# Patient Record
Sex: FEMALE | URBAN - METROPOLITAN AREA
[De-identification: names, ages, dates, MRNs, and addresses within clinical notes are randomized per-mention and may not be internally consistent; named-entity substitution may affect disease eponyms.]

---

## 2023-04-13 DIAGNOSIS — J45.909 ASTHMA, UNSPECIFIED ASTHMA SEVERITY, UNSPECIFIED WHETHER COMPLICATED, UNSPECIFIED WHETHER PERSISTENT (HHS-HCC): Primary | ICD-10-CM

## 2023-04-13 PROBLEM — I10 HYPERTENSION: Status: ACTIVE | Noted: 2023-04-13

## 2023-04-13 PROBLEM — E66.9 OBESITY, CLASS I, BMI 30-34.9: Status: ACTIVE | Noted: 2023-04-13

## 2023-04-13 PROBLEM — E66.811 OBESITY, CLASS I, BMI 30-34.9: Status: ACTIVE | Noted: 2023-04-13

## 2023-04-13 PROBLEM — R79.89 ELEVATED LFTS: Status: ACTIVE | Noted: 2023-04-13

## 2023-04-13 PROBLEM — E55.9 VITAMIN D DEFICIENCY: Status: ACTIVE | Noted: 2023-04-13

## 2023-04-13 PROBLEM — E11.9 DIABETES MELLITUS, TYPE 2 (MULTI): Status: ACTIVE | Noted: 2023-04-13

## 2023-04-13 PROBLEM — E78.5 HYPERLIPIDEMIA: Status: ACTIVE | Noted: 2023-04-13

## 2023-04-13 RX ORDER — BUDESONIDE AND FORMOTEROL FUMARATE DIHYDRATE 160; 4.5 UG/1; UG/1
2 AEROSOL RESPIRATORY (INHALATION) 2 TIMES DAILY
COMMUNITY
Start: 2020-03-17 | End: 2023-10-16

## 2023-04-13 RX ORDER — ASPIRIN 81 MG/1
1 TABLET ORAL DAILY
COMMUNITY
Start: 2017-11-08

## 2023-04-13 RX ORDER — METFORMIN HYDROCHLORIDE 1000 MG/1
TABLET ORAL
COMMUNITY
Start: 2017-11-08 | End: 2023-12-05 | Stop reason: ALTCHOICE

## 2023-04-13 RX ORDER — ALBUTEROL SULFATE 90 UG/1
2 AEROSOL, METERED RESPIRATORY (INHALATION) EVERY 4 HOURS PRN
Qty: 18 G | Refills: 2 | Status: SHIPPED | OUTPATIENT
Start: 2023-04-13 | End: 2023-12-05 | Stop reason: SDUPTHER

## 2023-04-13 RX ORDER — LORATADINE 10 MG/1
1 TABLET ORAL DAILY
COMMUNITY
Start: 2017-11-08

## 2023-04-13 RX ORDER — ALBUTEROL SULFATE 90 UG/1
2 AEROSOL, METERED RESPIRATORY (INHALATION) EVERY 4 HOURS PRN
COMMUNITY
Start: 2020-03-17 | End: 2023-04-13 | Stop reason: SDUPTHER

## 2023-04-13 RX ORDER — FLUTICASONE PROPIONATE 50 MCG
2 SPRAY, SUSPENSION (ML) NASAL DAILY
COMMUNITY
Start: 2017-11-08

## 2023-04-13 RX ORDER — CARVEDILOL 6.25 MG/1
1 TABLET ORAL
COMMUNITY
Start: 2017-11-08 | End: 2023-11-01 | Stop reason: SDUPTHER

## 2023-04-13 RX ORDER — ATORVASTATIN CALCIUM 10 MG/1
1 TABLET, FILM COATED ORAL NIGHTLY
COMMUNITY
Start: 2020-03-17 | End: 2024-01-30 | Stop reason: SDUPTHER

## 2023-04-13 RX ORDER — LISINOPRIL 40 MG/1
1 TABLET ORAL DAILY
COMMUNITY
Start: 2015-10-23 | End: 2023-12-05 | Stop reason: SDUPTHER

## 2023-04-13 NOTE — TELEPHONE ENCOUNTER
Requested Prescriptions     Pending Prescriptions Disp Refills    albuterol 90 mcg/actuation inhaler 18 g 2     Sig: Inhale 2 puffs every 4 hours if needed for wheezing.

## 2023-11-01 DIAGNOSIS — I10 PRIMARY HYPERTENSION: Primary | ICD-10-CM

## 2023-11-01 NOTE — TELEPHONE ENCOUNTER
Patient is requesting a refill on her       Carvedilol 6.25 mg      Sent to [facility]      Thank You       Patient has an appointment on 12/5/23

## 2023-11-01 NOTE — TELEPHONE ENCOUNTER
Patient requests prescription below    Last Office Visit: 2/17/23  Pt has apt scheduled  Requested Prescriptions     Pending Prescriptions Disp Refills    carvedilol (Coreg) 6.25 mg tablet 60 tablet 1     Sig: Take 1 tablet (6.25 mg) by mouth 2 times a day with meals.

## 2023-11-02 RX ORDER — CARVEDILOL 6.25 MG/1
6.25 TABLET ORAL
Qty: 60 TABLET | Refills: 3 | Status: SHIPPED | OUTPATIENT
Start: 2023-11-02 | End: 2024-04-03 | Stop reason: SDUPTHER

## 2023-12-05 ENCOUNTER — OFFICE VISIT (OUTPATIENT)
Dept: PRIMARY CARE | Facility: CLINIC | Age: 48
End: 2023-12-05
Payer: COMMERCIAL

## 2023-12-05 VITALS
OXYGEN SATURATION: 95 % | WEIGHT: 162.4 LBS | BODY MASS INDEX: 29.88 KG/M2 | RESPIRATION RATE: 18 BRPM | TEMPERATURE: 97.6 F | HEIGHT: 62 IN | HEART RATE: 92 BPM | DIASTOLIC BLOOD PRESSURE: 103 MMHG | SYSTOLIC BLOOD PRESSURE: 158 MMHG

## 2023-12-05 DIAGNOSIS — E55.9 VITAMIN D DEFICIENCY: ICD-10-CM

## 2023-12-05 DIAGNOSIS — E78.5 HYPERLIPIDEMIA, UNSPECIFIED HYPERLIPIDEMIA TYPE: ICD-10-CM

## 2023-12-05 DIAGNOSIS — E11.9 TYPE 2 DIABETES MELLITUS WITHOUT COMPLICATION, WITHOUT LONG-TERM CURRENT USE OF INSULIN (MULTI): ICD-10-CM

## 2023-12-05 DIAGNOSIS — J45.909 ASTHMA, UNSPECIFIED ASTHMA SEVERITY, UNSPECIFIED WHETHER COMPLICATED, UNSPECIFIED WHETHER PERSISTENT (HHS-HCC): ICD-10-CM

## 2023-12-05 DIAGNOSIS — Z83.49 FAMILY HISTORY OF THYROID DISEASE: ICD-10-CM

## 2023-12-05 DIAGNOSIS — E53.8 VITAMIN B12 DEFICIENCY: ICD-10-CM

## 2023-12-05 DIAGNOSIS — I10 HYPERTENSION, UNSPECIFIED TYPE: Primary | ICD-10-CM

## 2023-12-05 PROCEDURE — 99213 OFFICE O/P EST LOW 20 MIN: CPT | Performed by: FAMILY MEDICINE

## 2023-12-05 RX ORDER — METFORMIN HYDROCHLORIDE 500 MG/1
1000 TABLET, EXTENDED RELEASE ORAL
Qty: 360 TABLET | Refills: 1 | Status: SHIPPED | OUTPATIENT
Start: 2023-12-05 | End: 2024-06-02

## 2023-12-05 RX ORDER — LISINOPRIL 40 MG/1
40 TABLET ORAL DAILY
Qty: 90 TABLET | Refills: 1 | Status: SHIPPED | OUTPATIENT
Start: 2023-12-05 | End: 2024-06-02

## 2023-12-05 RX ORDER — ALBUTEROL SULFATE 90 UG/1
2 AEROSOL, METERED RESPIRATORY (INHALATION) EVERY 4 HOURS PRN
Qty: 18 G | Refills: 1 | Status: SHIPPED | OUTPATIENT
Start: 2023-12-05

## 2023-12-05 NOTE — PROGRESS NOTES
Subjective   Patient ID: Marielos Vazquez is a 48 y.o. female who presents for Med Refill (6 month med check, ).    HPI   Patient comes in today for checkup and refill of her medications.  The extended release metformin is working much better for her than the regular metformin.  She is currently without complaints.  Her blood pressure is running on the higher side and I have asked her to start checking it at home on a regular basis to see how high it actually is.  She will do so.  She is also way overdue for lab work.    2/17/2023  Patient comes in today for checkup and refill of medications. She had a hemoglobin A1c in August 2022 which was 6.8. She has been taking all her medicines as directed except she ran out of her blood pressure medications and her blood pressure is high today. She is having an issue with metformin causing loose stools.    2/18/2022  Patient presents today for 6-month checkup and refill of meds. She currently is without complaints. She states that she is taking her medicines as directed and is not having any side effects from them.    8/31/2021  Patient presents today for 6-month checkup and refill of meds. She currently is without complaints. She states that she is taking her medicines as directed and is not having any side effects from them. She did not take her blood pressure medicines today and thus her blood pressure is high. She states she just recently had a wellness check at work and her blood pressure was 124/80. They did also draw blood and her total cholesterol was 202, HDL 42, , triglycerides 266, TC/HDL ratio 4.8 and hemoglobin A1c 6.8. I asked her to get me a copy of all these for her chart. Patient is also engaged and she and her fiancé are getting  next year.    10/26/2020  Patient comes in today after tripping and falling onto her face 2 days ago on concrete. She sustained an abrasion to her upper lip and nose and a cut on the inside of her lip and her upper teeth  "are numb but not loose. She denies any other symptoms or injuries at this time.      3/17/2020  Patient comes in today for her first visit in 4 years. She comes in the midst of a coronavirus pandemic but she is otherwise healthy currently. She needs a refill of all her medications as well as her lab work. She is otherwise without complaints.    1/22/16  Patient comes in today for follow-up on her blood pressure. It is still high. She states that she has not had it checked outside of the office here but it has been high each visit since she has been coming here. I have asked her to have it checked at work she states that she can. If it is high there as well then we need to fine tune it a little bit more. She has been watching her diet as well as her caffeine intake. She has lost 2 pounds since her last visit here. Her mother and sister have blood pressure issues as well.       Review of Systems   All other systems reviewed and are negative.      Objective   BP (!) 158/103   Pulse 92   Temp 36.4 °C (97.6 °F)   Resp 18   Ht 1.575 m (5' 2\")   Wt 73.7 kg (162 lb 6.4 oz)   SpO2 95%   BMI 29.70 kg/m²     Physical Exam  Constitutional:       Appearance: She is well-developed.   HENT:      Head: Normocephalic and atraumatic.      Right Ear: Tympanic membrane, ear canal and external ear normal.      Left Ear: Tympanic membrane, ear canal and external ear normal.      Nose: Nose normal.      Mouth/Throat:      Mouth: Mucous membranes are moist.      Pharynx: Oropharynx is clear.   Eyes:      Extraocular Movements: Extraocular movements intact.      Conjunctiva/sclera: Conjunctivae normal.      Pupils: Pupils are equal, round, and reactive to light.   Cardiovascular:      Rate and Rhythm: Normal rate and regular rhythm.      Heart sounds: Normal heart sounds. No murmur heard.  Pulmonary:      Effort: Pulmonary effort is normal.      Breath sounds: Normal breath sounds. No wheezing.   Abdominal:      General: Abdomen is " flat. Bowel sounds are normal.      Palpations: Abdomen is soft.   Musculoskeletal:         General: Normal range of motion.   Skin:     General: Skin is warm and dry.   Neurological:      General: No focal deficit present.      Mental Status: She is alert and oriented to person, place, and time. Mental status is at baseline.   Psychiatric:         Mood and Affect: Mood normal.         Behavior: Behavior normal.         Thought Content: Thought content normal.         Judgment: Judgment normal.         Assessment/Plan   Problem List Items Addressed This Visit             ICD-10-CM    Asthma J45.909    Relevant Medications    albuterol 90 mcg/actuation inhaler    Diabetes mellitus, type 2 (CMS/[facility]) E11.9    Relevant Medications    metFORMIN XR (Glucophage-XR) 500 mg 24 hr tablet    Other Relevant Orders    Hemoglobin A1C    Hyperlipidemia E78.5    Relevant Orders    Lipid Panel    Hypertension - Primary I10    Relevant Medications    lisinopril 40 mg tablet    Other Relevant Orders    Comprehensive Metabolic Panel    Vitamin D deficiency E55.9    Relevant Orders    Vitamin D 25-Hydroxy,Total (for eval of Vitamin D levels)     Other Visit Diagnoses         Codes    Family history of thyroid disease     Z83.49    Relevant Orders    TSH with reflex to Free T4 if abnormal    Vitamin B12 deficiency     E53.8    Relevant Orders    CBC    Vitamin B12        Will contact patient with lab results when available.  Continue current meds as directed.  Follow up in 6 months for recheck if all remains stable, sooner if problems arise.  Medication list reconciled.  Thank you for visiting today!      Professional services: Some of this note was completed using Dragon voice technology and sometimes the software misinterprets words. This may include unintended errors with respect to translation of words, typographical errors or grammar errors which may not have been identified prior to finalization of the chart note. Please take this  into account when reading the note. Thank you.

## 2024-01-30 DIAGNOSIS — E78.5 HYPERLIPIDEMIA, UNSPECIFIED HYPERLIPIDEMIA TYPE: ICD-10-CM

## 2024-01-30 RX ORDER — ATORVASTATIN CALCIUM 10 MG/1
10 TABLET, FILM COATED ORAL NIGHTLY
Qty: 90 TABLET | Refills: 1 | Status: SHIPPED | OUTPATIENT
Start: 2024-01-30 | End: 2024-07-28

## 2024-01-30 NOTE — TELEPHONE ENCOUNTER
Patient is requesting a refill on her       Atorvastatin 10 mg      Sent to [facility]     Thank You       Patient last seen on 12/5/23

## 2024-01-30 NOTE — TELEPHONE ENCOUNTER
Requested Prescriptions     Pending Prescriptions Disp Refills    atorvastatin (Lipitor) 10 mg tablet 90 tablet 1     Sig: Take 1 tablet (10 mg) by mouth once daily at bedtime.

## 2024-04-03 DIAGNOSIS — I10 PRIMARY HYPERTENSION: ICD-10-CM

## 2024-04-03 RX ORDER — CARVEDILOL 6.25 MG/1
6.25 TABLET ORAL
Qty: 180 TABLET | Refills: 0 | Status: SHIPPED | OUTPATIENT
Start: 2024-04-03 | End: 2024-07-02

## 2024-04-03 NOTE — TELEPHONE ENCOUNTER
Requested Prescriptions     Pending Prescriptions Disp Refills    carvedilol (Coreg) 6.25 mg tablet 180 tablet 0     Sig: Take 1 tablet (6.25 mg) by mouth 2 times a day with meals.

## 2024-04-03 NOTE — TELEPHONE ENCOUNTER
Rx Refill Request Telephone Encounter    Name:  Marielos Vazquez  :  [id number]  Medication Name:  Carvedilol (Coreg) 6.25 mg tablet         Specific Pharmacy location:    [place] [address] Phone: [phone]   Fax: [phone]        Date of last appointment:  23  Date of next appointment:  24  Best number to reach patient:  [phone]

## 2024-06-05 ENCOUNTER — APPOINTMENT (OUTPATIENT)
Dept: PRIMARY CARE | Facility: CLINIC | Age: 49
End: 2024-06-05
Payer: COMMERCIAL

## 2024-06-20 ENCOUNTER — APPOINTMENT (OUTPATIENT)
Dept: PRIMARY CARE | Facility: CLINIC | Age: 49
End: 2024-06-20
Payer: COMMERCIAL

## 2024-07-29 DIAGNOSIS — I10 PRIMARY HYPERTENSION: ICD-10-CM

## 2024-07-29 RX ORDER — CARVEDILOL 6.25 MG/1
6.25 TABLET ORAL
Qty: 60 TABLET | Refills: 1 | Status: SHIPPED | OUTPATIENT
Start: 2024-07-29 | End: 2024-09-27

## 2024-07-29 NOTE — TELEPHONE ENCOUNTER
Requested Prescriptions     Pending Prescriptions Disp Refills    carvedilol (Coreg) 6.25 mg tablet 60 tablet 0     Sig: Take 1 tablet (6.25 mg) by mouth 2 times daily (morning and late afternoon).

## 2024-07-29 NOTE — TELEPHONE ENCOUNTER
Rx Refill Request Telephone Encounter    Name:  Marielos Vazuqez  :  [id number]  Medication Name:  carvedilol (Coreg) 6.25 mg tablet       Specific Pharmacy location:    [place] [address] Phone: [phone]   Fax: [phone]        Date of last appointment:  23  Date of next appointment:  24  Best number to reach patient:  [phone]      Thank You

## 2024-08-06 LAB
NON-UH HIE A/G RATIO: 0.9
NON-UH HIE ALB: 3.7 G/DL (ref 3.4–5)
NON-UH HIE ALK PHOS: 63 UNIT/L (ref 45–117)
NON-UH HIE BILIRUBIN, TOTAL: 0.7 MG/DL (ref 0.3–1.2)
NON-UH HIE BUN/CREAT RATIO: 15
NON-UH HIE BUN: 15 MG/DL (ref 9–23)
NON-UH HIE CALCIUM: 9.6 MG/DL (ref 8.7–10.4)
NON-UH HIE CALCULATED LDL CHOLESTEROL: 101 MG/DL (ref 60–130)
NON-UH HIE CALCULATED OSMOLALITY: 276 MOSM/KG (ref 275–295)
NON-UH HIE CHLORIDE: 101 MMOL/L (ref 98–107)
NON-UH HIE CHOLESTEROL: 184 MG/DL (ref 100–200)
NON-UH HIE CO2, VENOUS: 26 MMOL/L (ref 20–31)
NON-UH HIE CREATININE: 1 MG/DL (ref 0.5–0.8)
NON-UH HIE GFR AA: >60
NON-UH HIE GLOBULIN: 4 G/DL
NON-UH HIE GLOMERULAR FILTRATION RATE: 58 ML/MIN/1.73M?
NON-UH HIE GLUCOSE: 187 MG/DL (ref 74–106)
NON-UH HIE GOT: 37 UNIT/L (ref 15–37)
NON-UH HIE GPT: 69 UNIT/L (ref 10–49)
NON-UH HIE HCT: 40.9 % (ref 36–46)
NON-UH HIE HDL CHOLESTEROL: 47 MG/DL (ref 40–60)
NON-UH HIE HGB A1C: 7.7 %
NON-UH HIE HGB: 13.8 G/DL (ref 12–16)
NON-UH HIE INSTR WBC ND: 8.4
NON-UH HIE K: 4.9 MMOL/L (ref 3.5–5.1)
NON-UH HIE MCH: 32.4 PG (ref 27–34)
NON-UH HIE MCHC: 33.7 G/DL (ref 32–37)
NON-UH HIE MCV: 96.1 FL (ref 80–100)
NON-UH HIE MPV: 7.8 FL (ref 7.4–10.4)
NON-UH HIE NA: 135 MMOL/L (ref 135–145)
NON-UH HIE PLATELET: 316 X10 (ref 150–450)
NON-UH HIE RBC: 4.26 X10 (ref 4.2–5.4)
NON-UH HIE RDW: 13.4 % (ref 11.5–14.5)
NON-UH HIE TOTAL CHOL/HDL CHOL RATIO: 3.9
NON-UH HIE TOTAL PROTEIN: 7.7 G/DL (ref 5.7–8.2)
NON-UH HIE TRIGLYCERIDES: 180 MG/DL (ref 30–150)
NON-UH HIE TSH: 2.34 UIU/ML (ref 0.55–4.78)
NON-UH HIE VIT D 25: 20 NG/ML
NON-UH HIE VITAMIN B12: 309 PG/ML (ref 211–911)
NON-UH HIE WBC: 8.4 X10 (ref 4.5–11)

## 2024-08-12 DIAGNOSIS — E11.9 TYPE 2 DIABETES MELLITUS WITHOUT COMPLICATION, WITHOUT LONG-TERM CURRENT USE OF INSULIN (MULTI): Primary | ICD-10-CM

## 2024-08-12 RX ORDER — GLIPIZIDE 5 MG/1
5 TABLET ORAL
Qty: 180 TABLET | Refills: 1 | Status: SHIPPED | OUTPATIENT
Start: 2024-08-12 | End: 2025-02-08

## 2024-08-29 ENCOUNTER — APPOINTMENT (OUTPATIENT)
Dept: PRIMARY CARE | Facility: CLINIC | Age: 49
End: 2024-08-29
Payer: COMMERCIAL

## 2024-08-29 VITALS
TEMPERATURE: 99.1 F | OXYGEN SATURATION: 98 % | HEART RATE: 95 BPM | BODY MASS INDEX: 30 KG/M2 | DIASTOLIC BLOOD PRESSURE: 109 MMHG | RESPIRATION RATE: 20 BRPM | SYSTOLIC BLOOD PRESSURE: 167 MMHG | WEIGHT: 164 LBS

## 2024-08-29 DIAGNOSIS — E66.9 OBESITY, CLASS I, BMI 30-34.9: ICD-10-CM

## 2024-08-29 DIAGNOSIS — I10 HYPERTENSION, UNSPECIFIED TYPE: Primary | ICD-10-CM

## 2024-08-29 DIAGNOSIS — E11.9 TYPE 2 DIABETES MELLITUS WITHOUT COMPLICATION, WITHOUT LONG-TERM CURRENT USE OF INSULIN (MULTI): ICD-10-CM

## 2024-08-29 DIAGNOSIS — E78.5 HYPERLIPIDEMIA, UNSPECIFIED HYPERLIPIDEMIA TYPE: ICD-10-CM

## 2024-08-29 DIAGNOSIS — J45.909 ASTHMA, UNSPECIFIED ASTHMA SEVERITY, UNSPECIFIED WHETHER COMPLICATED, UNSPECIFIED WHETHER PERSISTENT (HHS-HCC): ICD-10-CM

## 2024-08-29 PROCEDURE — 99213 OFFICE O/P EST LOW 20 MIN: CPT | Performed by: FAMILY MEDICINE

## 2024-08-29 RX ORDER — CARVEDILOL 12.5 MG/1
12.5 TABLET ORAL
Qty: 60 TABLET | Refills: 1 | Status: SHIPPED | OUTPATIENT
Start: 2024-08-29 | End: 2024-10-28

## 2024-08-29 RX ORDER — ALBUTEROL SULFATE 90 UG/1
2 INHALANT RESPIRATORY (INHALATION) EVERY 4 HOURS PRN
Qty: 18 G | Refills: 1 | Status: SHIPPED | OUTPATIENT
Start: 2024-08-29

## 2024-08-29 RX ORDER — ATORVASTATIN CALCIUM 10 MG/1
10 TABLET, FILM COATED ORAL NIGHTLY
Qty: 90 TABLET | Refills: 1 | Status: SHIPPED | OUTPATIENT
Start: 2024-08-29 | End: 2025-02-25

## 2024-08-29 RX ORDER — LISINOPRIL 40 MG/1
40 TABLET ORAL DAILY
Qty: 90 TABLET | Refills: 1 | Status: SHIPPED | OUTPATIENT
Start: 2024-08-29 | End: 2025-02-25

## 2024-08-29 RX ORDER — BUDESONIDE AND FORMOTEROL FUMARATE DIHYDRATE 160; 4.5 UG/1; UG/1
2 AEROSOL RESPIRATORY (INHALATION) 2 TIMES DAILY
Qty: 30.6 G | Refills: 3 | Status: SHIPPED | OUTPATIENT
Start: 2024-08-29

## 2024-08-29 RX ORDER — METFORMIN HYDROCHLORIDE 500 MG/1
1000 TABLET, EXTENDED RELEASE ORAL
Qty: 360 TABLET | Refills: 1 | Status: SHIPPED | OUTPATIENT
Start: 2024-08-29 | End: 2025-02-25

## 2024-08-29 ASSESSMENT — PATIENT HEALTH QUESTIONNAIRE - PHQ9
2. FEELING DOWN, DEPRESSED OR HOPELESS: NOT AT ALL
SUM OF ALL RESPONSES TO PHQ9 QUESTIONS 1 AND 2: 0
1. LITTLE INTEREST OR PLEASURE IN DOING THINGS: NOT AT ALL

## 2024-08-29 ASSESSMENT — ENCOUNTER SYMPTOMS
OCCASIONAL FEELINGS OF UNSTEADINESS: 0
DEPRESSION: 0
LOSS OF SENSATION IN FEET: 0

## 2024-08-29 NOTE — PROGRESS NOTES
Subjective   Patient ID: Marielos Vazquez is a 49 y.o. female who presents for Follow-up (6 mo med fu. No questions, concerns, or complaints. /).    HPI  Patient presents today for 3-month checkup and refill of meds. She currently is without complaints. She states that she is taking her medicines as directed and is not having any side effects from them.    Her blood pressure is high today but she has no symptoms of that.  I have asked her to start keeping a blood pressure log and to contact me in 2 weeks with an update on her blood pressure.  If it continues to be high we will need to start medication.    Patient claims that she has been watching her blood sugars and it has run been running between 80 and 120.    Review of Systems   All other systems reviewed and are negative.    Objective   Vitals:  BP (!) 167/109   Pulse 95   Temp 37.3 °C (99.1 °F)   Resp 20   Wt 74.4 kg (164 lb)   LMP  (LMP Unknown)   SpO2 98%   BMI 30.00 kg/m²     Physical Exam  Vitals reviewed.   Constitutional:       Appearance: She is well-developed. She is obese.   HENT:      Head: Normocephalic and atraumatic.      Right Ear: Tympanic membrane, ear canal and external ear normal.      Left Ear: Tympanic membrane, ear canal and external ear normal.      Nose: Nose normal.      Mouth/Throat:      Mouth: Mucous membranes are moist.      Pharynx: Oropharynx is clear.   Eyes:      Extraocular Movements: Extraocular movements intact.      Conjunctiva/sclera: Conjunctivae normal.      Pupils: Pupils are equal, round, and reactive to light.   Cardiovascular:      Rate and Rhythm: Normal rate and regular rhythm.      Heart sounds: Normal heart sounds. No murmur heard.  Pulmonary:      Effort: Pulmonary effort is normal.      Breath sounds: Normal breath sounds. No wheezing.   Abdominal:      General: Abdomen is flat. Bowel sounds are normal.      Palpations: Abdomen is soft.   Musculoskeletal:         General: Normal range of motion.   Skin:      General: Skin is warm and dry.   Neurological:      General: No focal deficit present.      Mental Status: She is alert and oriented to person, place, and time. Mental status is at baseline.   Psychiatric:         Mood and Affect: Mood normal.         Behavior: Behavior normal.         Thought Content: Thought content normal.         Judgment: Judgment normal.         Assessment/Plan   Problem List Items Addressed This Visit       Asthma ([facility])    Relevant Medications    budesonide-formoteroL (Symbicort) 160-4.5 mcg/actuation inhaler    albuterol 90 mcg/actuation inhaler    Diabetes mellitus, type 2 (Multi)    Relevant Medications    metFORMIN XR (Glucophage-XR) 500 mg 24 hr tablet    Hyperlipidemia    Relevant Medications    atorvastatin (Lipitor) 10 mg tablet    Hypertension - Primary    Relevant Medications    carvedilol (Coreg) 12.5 mg tablet    lisinopril 40 mg tablet    Obesity, Class I, BMI 30-34.9   Take new dose of carvedilol medication as directed.  Continue other medications as directed.  RTC in one month for recheck, sooner should problems arise.  Medication list reconciled.  Thank you for visiting today!      Professional services: Some of this note was completed using Dragon voice technology and sometimes the software misinterprets words. This may include unintended errors with respect to translation of words, typographical errors or grammar errors which may not have been identified prior to finalization of the chart note. Please take this into account when reading the note. Thank you.               Kaz Thomas DO 08/31/24 8:40 AM

## 2024-09-17 ENCOUNTER — PATIENT MESSAGE (OUTPATIENT)
Dept: PRIMARY CARE | Facility: CLINIC | Age: 49
End: 2024-09-17
Payer: COMMERCIAL

## 2024-12-02 ENCOUNTER — APPOINTMENT (OUTPATIENT)
Dept: PRIMARY CARE | Facility: CLINIC | Age: 49
End: 2024-12-02
Payer: COMMERCIAL

## 2024-12-02 VITALS
BODY MASS INDEX: 29.81 KG/M2 | WEIGHT: 163 LBS | OXYGEN SATURATION: 98 % | SYSTOLIC BLOOD PRESSURE: 138 MMHG | DIASTOLIC BLOOD PRESSURE: 76 MMHG | TEMPERATURE: 97.2 F | RESPIRATION RATE: 16 BRPM | HEART RATE: 77 BPM

## 2024-12-02 DIAGNOSIS — E53.8 VITAMIN B12 DEFICIENCY: Primary | ICD-10-CM

## 2024-12-02 DIAGNOSIS — I10 HYPERTENSION, UNSPECIFIED TYPE: ICD-10-CM

## 2024-12-02 DIAGNOSIS — E55.9 VITAMIN D DEFICIENCY: ICD-10-CM

## 2024-12-02 DIAGNOSIS — E11.9 TYPE 2 DIABETES MELLITUS WITHOUT COMPLICATION, WITHOUT LONG-TERM CURRENT USE OF INSULIN (MULTI): ICD-10-CM

## 2024-12-02 PROCEDURE — 99214 OFFICE O/P EST MOD 30 MIN: CPT | Performed by: FAMILY MEDICINE

## 2024-12-02 RX ORDER — GLIPIZIDE 5 MG/1
5 TABLET ORAL
Qty: 180 TABLET | Refills: 1 | Status: SHIPPED | OUTPATIENT
Start: 2024-12-02 | End: 2025-05-31

## 2024-12-02 RX ORDER — CARVEDILOL 25 MG/1
25 TABLET ORAL
Qty: 180 TABLET | Refills: 1 | Status: SHIPPED | OUTPATIENT
Start: 2024-12-02 | End: 2025-05-31

## 2024-12-02 ASSESSMENT — ENCOUNTER SYMPTOMS
DEPRESSION: 0
LOSS OF SENSATION IN FEET: 0
OCCASIONAL FEELINGS OF UNSTEADINESS: 0

## 2024-12-02 NOTE — PROGRESS NOTES
Subjective   Patient ID: Marielos Vazquez is a 49 y.o. female who presents for Follow-up (6 mo med fu. No questions, concerns, or complaints. Says BP is mostly controled at home. /).  HPI  Patient presents today for 6-month checkup and refill of meds. She currently is without complaints. She states that she is taking her medicines as directed and is not having any side effects from them.  Her blood pressure is high today in the office but she states that normally is under good control at home.  She claims her blood sugar this morning was 103.  She is due for lab work.    Review of Systems   All other systems reviewed and are negative.      Objective   Vitals:  /76 Comment: On calibrated home cuff  Pulse 77   Temp 36.2 °C (97.2 °F)   Resp 16   Wt 73.9 kg (163 lb)   SpO2 98%   BMI 29.81 kg/m²     Physical Exam  Vitals reviewed.   Constitutional:       Appearance: She is well-developed.   HENT:      Head: Normocephalic and atraumatic.      Right Ear: Tympanic membrane, ear canal and external ear normal.      Left Ear: Tympanic membrane, ear canal and external ear normal.      Nose: Nose normal.      Mouth/Throat:      Mouth: Mucous membranes are moist.      Pharynx: Oropharynx is clear.   Eyes:      Extraocular Movements: Extraocular movements intact.      Conjunctiva/sclera: Conjunctivae normal.      Pupils: Pupils are equal, round, and reactive to light.   Cardiovascular:      Rate and Rhythm: Normal rate and regular rhythm.      Heart sounds: Normal heart sounds. No murmur heard.  Pulmonary:      Effort: Pulmonary effort is normal.      Breath sounds: Normal breath sounds. No wheezing.   Abdominal:      General: Abdomen is flat. Bowel sounds are normal.      Palpations: Abdomen is soft.   Musculoskeletal:         General: Normal range of motion.   Skin:     General: Skin is warm and dry.   Neurological:      General: No focal deficit present.      Mental Status: She is alert and oriented to person, place,  and time. Mental status is at baseline.   Psychiatric:         Mood and Affect: Mood normal.         Behavior: Behavior normal.         Thought Content: Thought content normal.         Judgment: Judgment normal.       Assessment/Plan   Problem List Items Addressed This Visit       Diabetes mellitus, type 2 (Multi)    Relevant Medications    glipiZIDE (Glucotrol) 5 mg tablet    Other Relevant Orders    Hemoglobin A1C    Hypertension    Relevant Medications    carvedilol (Coreg) 25 mg tablet    Vitamin D deficiency    Relevant Orders    Vitamin D 25-Hydroxy,Total (for eval of Vitamin D levels)     Other Visit Diagnoses       Vitamin B12 deficiency    -  Primary    Relevant Orders    Vitamin B12        Take new dose of medication as directed.  Continue other medications as directed.  RTC in one month for recheck, sooner should problems arise.  Medication list reconciled.  Thank you for visiting today!      Professional services: Some of this note was completed using Dragon voice technology and sometimes the software misinterprets words. This may include unintended errors with respect to translation of words, typographical errors or grammar errors which may not have been identified prior to finalization of the chart note. Please take this into account when reading the note. Thank you.       Kaz Thomas DO 12/03/24 7:23 AM

## 2024-12-30 DIAGNOSIS — J45.909 ASTHMA, UNSPECIFIED ASTHMA SEVERITY, UNSPECIFIED WHETHER COMPLICATED, UNSPECIFIED WHETHER PERSISTENT (HHS-HCC): ICD-10-CM

## 2024-12-30 RX ORDER — ALBUTEROL SULFATE 90 UG/1
2 INHALANT RESPIRATORY (INHALATION) EVERY 4 HOURS PRN
Qty: 18 G | Refills: 1 | Status: SHIPPED | OUTPATIENT
Start: 2024-12-30 | End: 2025-03-30

## 2024-12-30 NOTE — TELEPHONE ENCOUNTER
Pharmacy requests prescription below    Last Office Visit: 12/2/2024   Next Office Visit: Visit date not found     Requested Prescriptions     Pending Prescriptions Disp Refills    albuterol 90 mcg/actuation inhaler [Pharmacy Med Name: albuterol sulfate HFA 90 mcg/actuation aerosol inhaler] 6.7 g 0     Sig: Inhale 2 puffs every 4 hours if needed for wheezing.

## 2025-04-28 DIAGNOSIS — E11.9 TYPE 2 DIABETES MELLITUS WITHOUT COMPLICATION, WITHOUT LONG-TERM CURRENT USE OF INSULIN: ICD-10-CM

## 2025-04-28 DIAGNOSIS — I10 HYPERTENSION, UNSPECIFIED TYPE: ICD-10-CM

## 2025-04-28 RX ORDER — METFORMIN HYDROCHLORIDE 500 MG/1
1000 TABLET, EXTENDED RELEASE ORAL
Qty: 240 TABLET | Refills: 0 | Status: SHIPPED | OUTPATIENT
Start: 2025-04-28 | End: 2025-06-27

## 2025-04-28 RX ORDER — LISINOPRIL 40 MG/1
40 TABLET ORAL DAILY
Qty: 60 TABLET | Refills: 0 | Status: SHIPPED | OUTPATIENT
Start: 2025-04-28 | End: 2025-06-27

## 2025-04-28 NOTE — TELEPHONE ENCOUNTER
Requested Prescriptions     Pending Prescriptions Disp Refills    lisinopril 40 mg tablet 30 tablet 0     Sig: Take 1 tablet (40 mg) by mouth once daily.    metFORMIN XR (Glucophage-XR) 500 mg 24 hr tablet 120 tablet 0     Sig: Take 2 tablets (1,000 mg) by mouth 2 times daily (morning and late afternoon). Do not crush, chew, or split.

## 2025-04-28 NOTE — TELEPHONE ENCOUNTER
Rx Refill Request Telephone Encounter    Name:  Marielos Vazquez  :  [id number]  Medication Name:    lisinopril 40 mg tablet   metFORMIN XR (Glucophage-XR) 500 mg 24 hr tablet     Specific Pharmacy location:    [place] [address] Phone: [phone]   Fax: [phone]        Date of last appointment:  25   Date of next appointment:  25  Best number to reach patient:  [phone]        Thank You

## 2025-05-25 ENCOUNTER — PATIENT MESSAGE (OUTPATIENT)
Dept: PRIMARY CARE | Facility: CLINIC | Age: 50
End: 2025-05-25
Payer: COMMERCIAL

## 2025-05-25 DIAGNOSIS — E78.5 HYPERLIPIDEMIA, UNSPECIFIED HYPERLIPIDEMIA TYPE: ICD-10-CM

## 2025-05-27 RX ORDER — ATORVASTATIN CALCIUM 10 MG/1
10 TABLET, FILM COATED ORAL NIGHTLY
Qty: 90 TABLET | Refills: 1 | Status: SHIPPED | OUTPATIENT
Start: 2025-05-27 | End: 2025-11-23

## 2025-05-27 NOTE — PATIENT COMMUNICATION
Pt last OV 12/2/2024  Next OV 6/2/2025    .  Requested Prescriptions     Pending Prescriptions Disp Refills    atorvastatin (Lipitor) 10 mg tablet 90 tablet 1     Sig: Take 1 tablet (10 mg) by mouth once daily at bedtime.

## 2025-05-29 LAB
NON-UH HIE HGB A1C: 5.9 %
NON-UH HIE VIT D 25: 40 NG/ML
NON-UH HIE VITAMIN B12: 677 PG/ML (ref 211–911)

## 2025-06-02 ENCOUNTER — APPOINTMENT (OUTPATIENT)
Dept: PRIMARY CARE | Facility: CLINIC | Age: 50
End: 2025-06-02
Payer: COMMERCIAL

## 2025-06-02 VITALS
HEIGHT: 62 IN | DIASTOLIC BLOOD PRESSURE: 80 MMHG | RESPIRATION RATE: 20 BRPM | WEIGHT: 168 LBS | SYSTOLIC BLOOD PRESSURE: 138 MMHG | TEMPERATURE: 98.2 F | BODY MASS INDEX: 30.91 KG/M2 | HEART RATE: 76 BPM

## 2025-06-02 DIAGNOSIS — E11.9 TYPE 2 DIABETES MELLITUS WITHOUT COMPLICATION, WITHOUT LONG-TERM CURRENT USE OF INSULIN: ICD-10-CM

## 2025-06-02 DIAGNOSIS — I10 HYPERTENSION, ESSENTIAL, BENIGN: Primary | ICD-10-CM

## 2025-06-02 DIAGNOSIS — E66.811 OBESITY, CLASS I, BMI 30-34.9: ICD-10-CM

## 2025-06-02 DIAGNOSIS — J45.909 ASTHMA, UNSPECIFIED ASTHMA SEVERITY, UNSPECIFIED WHETHER COMPLICATED, UNSPECIFIED WHETHER PERSISTENT (HHS-HCC): ICD-10-CM

## 2025-06-02 PROCEDURE — 99213 OFFICE O/P EST LOW 20 MIN: CPT | Performed by: FAMILY MEDICINE

## 2025-06-02 RX ORDER — METFORMIN HYDROCHLORIDE 500 MG/1
1000 TABLET, EXTENDED RELEASE ORAL
Qty: 360 TABLET | Refills: 1 | Status: SHIPPED | OUTPATIENT
Start: 2025-06-02 | End: 2025-11-29

## 2025-06-02 RX ORDER — LISINOPRIL 40 MG/1
40 TABLET ORAL DAILY
Qty: 90 TABLET | Refills: 1 | Status: SHIPPED | OUTPATIENT
Start: 2025-06-02 | End: 2025-11-29

## 2025-06-02 RX ORDER — CARVEDILOL 25 MG/1
25 TABLET ORAL
Qty: 180 TABLET | Refills: 1 | Status: SHIPPED | OUTPATIENT
Start: 2025-06-02 | End: 2025-11-29

## 2025-06-02 RX ORDER — ALBUTEROL SULFATE 90 UG/1
2 INHALANT RESPIRATORY (INHALATION) EVERY 4 HOURS PRN
Qty: 18 G | Refills: 1 | Status: SHIPPED | OUTPATIENT
Start: 2025-06-02 | End: 2025-08-31

## 2025-06-02 NOTE — PROGRESS NOTES
"Subjective   Patient ID: Marielos Vazquez is a 50 y.o. female who presents for Follow-up (6 month med check. Pt says everything is well, no complaints. ).    HPI  Patient presents today for 6-month checkup and refill of meds.  She states that she is taking her medicines as directed and is not having any side effects from them. She currently is without complaints.    Patient is due for labs in August    Review of Systems   All other systems reviewed and are negative.    Objective   Vitals:  /80   Pulse 76   Temp 36.8 °C (98.2 °F)   Resp 20   Ht 1.575 m (5' 2\")   Wt 76.2 kg (168 lb)   BMI 30.73 kg/m²     Physical Exam  Vitals reviewed.   Constitutional:       Appearance: She is well-developed. She is obese.   HENT:      Head: Normocephalic and atraumatic.      Right Ear: Tympanic membrane, ear canal and external ear normal.      Left Ear: Tympanic membrane, ear canal and external ear normal.      Nose: Nose normal.      Mouth/Throat:      Mouth: Mucous membranes are moist.      Pharynx: Oropharynx is clear.   Eyes:      Extraocular Movements: Extraocular movements intact.      Conjunctiva/sclera: Conjunctivae normal.      Pupils: Pupils are equal, round, and reactive to light.   Cardiovascular:      Rate and Rhythm: Normal rate and regular rhythm.      Heart sounds: Normal heart sounds. No murmur heard.  Pulmonary:      Effort: Pulmonary effort is normal.      Breath sounds: Normal breath sounds. No wheezing.   Abdominal:      General: Abdomen is flat. Bowel sounds are normal.      Palpations: Abdomen is soft.   Musculoskeletal:         General: Normal range of motion.   Skin:     General: Skin is warm and dry.   Neurological:      General: No focal deficit present.      Mental Status: She is alert and oriented to person, place, and time. Mental status is at baseline.   Psychiatric:         Mood and Affect: Mood normal.         Behavior: Behavior normal.         Thought Content: Thought content normal.       " "  Judgment: Judgment normal.         CBC -  No results for input(s): \"WBC\", \"HGB\", \"HCT\", \"PLT\", \"MCV\" in the last 79998 hours.    CMP -  No results for input(s): \"NA\", \"K\", \"CL\", \"CO2\", \"ANIONGAP\", \"BUN\", \"CREATININE\", \"EGFR\", \"MG\" in the last 17577 hours.  No results for input(s): \"ALBUMIN\", \"ALKPHOS\", \"ALT\", \"AST\", \"BILITOT\" in the last 74170 hours.    No lab exists for component: \"CA\"    LIPID PANEL -   No results for input(s): \"CHOL\", \"LDLCALC\", \"LDLF\", \"HDL\", \"TRIG\" in the last 80391 hours.    No results for input(s): \"BNP\", \"HGBA1C\" in the last 58827 hours.    No results found for: \"CELGNHQF02\"    No results found for: \"VITD25\"     Assessment/Plan   Problem List Items Addressed This Visit       Asthma    Relevant Medications    albuterol 90 mcg/actuation inhaler    Diabetes mellitus, type 2 (Multi)    Relevant Medications    metFORMIN XR (Glucophage-XR) 500 mg 24 hr tablet    Hypertension, essential, benign - Primary    Relevant Medications    lisinopril 40 mg tablet    carvedilol (Coreg) 25 mg tablet    Obesity, Class I, BMI 30-34.9   Will contact patient with lab results when available in August.  Continue current meds as directed.  Follow up in 6 months for recheck if all remains stable, sooner if problems arise.     Patient exhibiting no signs of depression and does not need treatment at this time.  Medication list reconciled.  Thank you for visiting today!        Professional services: Some of this note was completed using Dragon voice technology and sometimes the software misinterprets words. This may include unintended errors with respect to translation of words, typographical errors or grammar errors which may not have been identified prior to finalization of the chart note. Please take this into account when reading the note. Thank you.              Kaz Thomas,  06/03/25 6:38 AM   "

## 2025-07-08 DIAGNOSIS — Z12.31 ENCOUNTER FOR SCREENING MAMMOGRAM FOR BREAST CANCER: ICD-10-CM
